# Patient Record
Sex: MALE | Race: WHITE | URBAN - METROPOLITAN AREA
[De-identification: names, ages, dates, MRNs, and addresses within clinical notes are randomized per-mention and may not be internally consistent; named-entity substitution may affect disease eponyms.]

---

## 2019-09-12 ENCOUNTER — APPOINTMENT (OUTPATIENT)
Dept: URBAN - METROPOLITAN AREA SURGERY 9 | Age: 54
Setting detail: DERMATOLOGY
End: 2019-09-12

## 2019-09-12 DIAGNOSIS — D18.0 HEMANGIOMA: ICD-10-CM

## 2019-09-12 DIAGNOSIS — I78.8 OTHER DISEASES OF CAPILLARIES: ICD-10-CM

## 2019-09-12 DIAGNOSIS — L72.0 EPIDERMAL CYST: ICD-10-CM

## 2019-09-12 PROBLEM — D18.01 HEMANGIOMA OF SKIN AND SUBCUTANEOUS TISSUE: Status: ACTIVE | Noted: 2019-09-12

## 2019-09-12 PROCEDURE — OTHER ACNE SURGERY COSMETIC: OTHER

## 2019-09-12 PROCEDURE — OTHER COSMETIC CONSULTATION: RED SPOTS: OTHER

## 2019-09-12 PROCEDURE — OTHER COSMETIC CONSULTATION - RED SPOTS: OTHER

## 2019-09-12 PROCEDURE — OTHER OTHER: OTHER

## 2019-09-12 PROCEDURE — OTHER COUNSELING: OTHER

## 2019-09-12 ASSESSMENT — LOCATION SIMPLE DESCRIPTION DERM
LOCATION SIMPLE: RIGHT NOSE
LOCATION SIMPLE: NOSE
LOCATION SIMPLE: LEFT SUPERIOR EYELID

## 2019-09-12 ASSESSMENT — LOCATION ZONE DERM
LOCATION ZONE: EYELID
LOCATION ZONE: NOSE

## 2019-09-12 ASSESSMENT — LOCATION DETAILED DESCRIPTION DERM
LOCATION DETAILED: LEFT MEDIAL SUPERIOR EYELID
LOCATION DETAILED: NASAL SUPRATIP
LOCATION DETAILED: RIGHT NASAL SIDEWALL

## 2019-09-12 NOTE — PROCEDURE: OTHER
Note Text (......Xxx Chief Complaint.): This diagnosis correlates with the
Detail Level: Detailed
Other (Free Text): Quoted cosmetic fee of $75 for shave removal.
Other (Free Text): Quoted cosmetic fee of $350 per treatment. Expectation of 2-3 treatments 1 month apart discussed.\\n\\nFew telang on chin and medial cheeks

## 2019-09-12 NOTE — PROCEDURE: ACNE SURGERY COSMETIC
Acne Type: Milial cysts
Detail Level: Detailed
Render Post-Care Instructions In Note?: no
Render Number Of Lesions Treated: yes
Post-Care Instructions: I reviewed with the patient in detail post-care instructions. Patient is to keep the treatment areaas dry overnight, and then apply bacitracin twice daily until healed. Patient may apply hydrogen peroxide soaks to remove any crusting.
Consent was obtained and risks were reviewed including but not limited to scarring, infection, bleeding, scabbing, incomplete removal, and allergy to anesthesia.
Price (Use Numbers Only, No Special Characters Or $): 75
Prep Text (Optional): Prior to removal the treatment areas were prepped in the usual fashion.\\n\\n1% LEB used for numbing the lesion prior to extraction
Extraction Method: 21 gauge needle
Hemostasis: Electrocautery